# Patient Record
Sex: FEMALE | Race: OTHER | HISPANIC OR LATINO | URBAN - METROPOLITAN AREA
[De-identification: names, ages, dates, MRNs, and addresses within clinical notes are randomized per-mention and may not be internally consistent; named-entity substitution may affect disease eponyms.]

---

## 2018-01-29 ENCOUNTER — APPOINTMENT (RX ONLY)
Dept: URBAN - METROPOLITAN AREA CLINIC 46 | Facility: CLINIC | Age: 23
Setting detail: DERMATOLOGY
End: 2018-01-29

## 2018-01-29 DIAGNOSIS — Z41.9 ENCOUNTER FOR PROCEDURE FOR PURPOSES OTHER THAN REMEDYING HEALTH STATE, UNSPECIFIED: ICD-10-CM

## 2018-01-29 PROCEDURE — ? LASER BROWN SPOTS

## 2018-01-29 ASSESSMENT — LOCATION ZONE DERM: LOCATION ZONE: TRUNK

## 2018-01-29 ASSESSMENT — LOCATION SIMPLE DESCRIPTION DERM
LOCATION SIMPLE: LEFT UPPER BACK
LOCATION SIMPLE: RIGHT BUTTOCK
LOCATION SIMPLE: LEFT BUTTOCK
LOCATION SIMPLE: RIGHT UPPER BACK

## 2018-01-29 ASSESSMENT — LOCATION DETAILED DESCRIPTION DERM
LOCATION DETAILED: LEFT MID-UPPER BACK
LOCATION DETAILED: RIGHT LATERAL UPPER BACK
LOCATION DETAILED: RIGHT BUTTOCK
LOCATION DETAILED: LEFT BUTTOCK

## 2018-01-29 NOTE — PROCEDURE: LASER BROWN SPOTS
Spot Size In Mm: 0
Post-Care Instructions: I reviewed with the patient in detail post-care instructions. verbal post care instructions given to patient. Patient is to apply bacitracin or neosporin with a q-tip to all crusted areas, and avoid picking at any scabs. Pt should stay away from the sun and wear sun protection until fully healed. verbal post care instructions given to patient. Tolerated procedure well.
Detail Level: Detailed
Pulse Duration: 1.5 ms
Post Procedure Text: Cleaned skin with alcohol and let air dry. bacitracin applied. patient tolerated procedure well. Verbal and written Post care instructions reviewed with patient. Consent obtained and before photos taken.  \\n1.5mm , 450w liposuction scars abdomen and buttocks \\nBacitracin applied
Consent: risks reviewed including but not limited to crusting, scabbing, blistering, scarring, darker or lighter pigmentary change, and/or incomplete removal. Bacitracin applied
Laser Type: Q-switched Nd:Yag 1064nm

## 2018-02-03 ENCOUNTER — APPOINTMENT (RX ONLY)
Dept: URBAN - METROPOLITAN AREA CLINIC 46 | Facility: CLINIC | Age: 23
Setting detail: DERMATOLOGY
End: 2018-02-03

## 2018-02-06 ENCOUNTER — APPOINTMENT (RX ONLY)
Dept: URBAN - METROPOLITAN AREA CLINIC 46 | Facility: CLINIC | Age: 23
Setting detail: DERMATOLOGY
End: 2018-02-06

## 2018-05-24 ENCOUNTER — APPOINTMENT (RX ONLY)
Dept: URBAN - METROPOLITAN AREA CLINIC 46 | Facility: CLINIC | Age: 23
Setting detail: DERMATOLOGY
End: 2018-05-24

## 2018-05-24 DIAGNOSIS — Z41.9 ENCOUNTER FOR PROCEDURE FOR PURPOSES OTHER THAN REMEDYING HEALTH STATE, UNSPECIFIED: ICD-10-CM

## 2018-05-24 PROCEDURE — ? PULSED-DYE LASER

## 2018-05-24 NOTE — PROCEDURE: MIPS QUALITY
Quality 226: Preventive Care And Screening: Tobacco Use: Screening And Cessation Intervention: Patient screened for tobacco and never smoked
Detail Level: Zone
Quality 130: Documentation Of Current Medications In The Medical Record: Current Medications Documented
Quality 131: Pain Assessment And Follow-Up: Pain assessment using a standardized tool is documented as negative, no follow-up plan required
Quality 431: Preventive Care And Screening: Unhealthy Alcohol Use - Screening: Patient screened for unhealthy alcohol use using a single question and scores less than 2 times per year

## 2018-05-24 NOTE — PROCEDURE: MIPS QUALITY
Quality 130: Documentation Of Current Medications In The Medical Record: Current Medications Documented
Quality 110: Preventive Care And Screening: Influenza Immunization: Influenza Immunization not Administered because Patient Refused.
Quality 431: Preventive Care And Screening: Unhealthy Alcohol Use - Screening: Patient screened for unhealthy alcohol use using a single question and scores less than 2 times per year
Quality 226: Preventive Care And Screening: Tobacco Use: Screening And Cessation Intervention: Patient screened for tobacco and never smoked
Detail Level: Zone
Quality 131: Pain Assessment And Follow-Up: Pain assessment using a standardized tool is documented as negative, no follow-up plan required

## 2018-05-24 NOTE — PROCEDURE: PULSED-DYE LASER
Cryogen Time (Ms): [address] [address]
Pulse Duration: 10 ms
Delay Time (Ms): [facility]
Delay Time (Ms): 20
Laser Type: Vbeam 595nm
Fluence In J/Cm2 (Optional): [address]
Post-Procedure Care: Patient tolerated procedure well, applied spf 30. Written  and verbal Post care reviewed with patient.  Applied SPF 30
Cryogen Time (Ms): 30
Spot Size: 10 mm
Fluence In J/Cm2 (Optional): 9.5
Immediate Endpoint: erythema
Detail Level: Zone
Treated Area: small area
Spot Size: 7 mm
Fluence In J/Cm2 (Optional): 7.0
Consent: Written consent obtained at previous visit . risks reviewed including but not limited to crusting, scabbing, blistering, scarring, darker or lighter pigmentary change, incidental hair removal, bruising, and/or incomplete.  Patient instructed not to be exposed in the sun 48 hours after treatment
Post-Care Instructions: I reviewed with the patient in detail post-care instructions. Patient should stay away from the sun and wear sun protection until treated areas are fully healed. Tolerated procedure well. Verbal and written post care instructions given to the patient. SPF 30 applied.

## 2018-05-24 NOTE — PROCEDURE: PULSED-DYE LASER
Detail Level: Zone
Cryogen Time (Ms): [address] [address]
Cryogen Time (Ms): 30
Pulse Duration: 10 ms
Laser Type: Vbeam 595nm
Post-Procedure Care: Patient tolerated procedure well, applied spf 30. Written  and verbal Post care reviewed with patient.  Applied SPF 30
Post-Care Instructions: I reviewed with the patient in detail post-care instructions. Patient should stay away from the sun and wear sun protection until treated areas are fully healed. Tolerated procedure well. Verbal and written post care instructions given to the patient. SPF 30 applied.
Spot Size: 7 mm
Fluence In J/Cm2 (Optional): 7.0
Immediate Endpoint: erythema
Spot Size: 10 mm
Fluence In J/Cm2 (Optional): 9.5
Fluence In J/Cm2 (Optional): [address]
Consent: Written consent obtained at previous visit . risks reviewed including but not limited to crusting, scabbing, blistering, scarring, darker or lighter pigmentary change, incidental hair removal, bruising, and/or incomplete.  Patient instructed not to be exposed in the sun 48 hours after treatment
Treated Area: small area
Delay Time (Ms): 4[facility]
Delay Time (Ms): 20